# Patient Record
Sex: MALE | Race: WHITE | NOT HISPANIC OR LATINO | URBAN - METROPOLITAN AREA
[De-identification: names, ages, dates, MRNs, and addresses within clinical notes are randomized per-mention and may not be internally consistent; named-entity substitution may affect disease eponyms.]

---

## 2022-10-03 ENCOUNTER — ESTABLISHED PATIENT (OUTPATIENT)
Facility: LOCATION | Age: 67
End: 2022-10-03

## 2022-10-03 DIAGNOSIS — H52.13: ICD-10-CM

## 2022-10-03 PROCEDURE — 92014 COMPRE OPH EXAM EST PT 1/>: CPT

## 2022-10-03 PROCEDURE — 92015KEC REFRACTION KEC

## 2022-10-03 ASSESSMENT — VISUAL ACUITY
OD_CC: J1+
OS_CC: 20/40+1
OD_CC: 20/30+2
OS_CC: J1+

## 2022-10-03 ASSESSMENT — TONOMETRY
OS_IOP_MMHG: 16
OD_IOP_MMHG: 16

## 2023-11-22 ENCOUNTER — ESTABLISHED PATIENT (OUTPATIENT)
Facility: LOCATION | Age: 68
End: 2023-11-22

## 2023-11-22 DIAGNOSIS — H52.13: ICD-10-CM

## 2023-11-22 DIAGNOSIS — H43.812: ICD-10-CM

## 2023-11-22 DIAGNOSIS — H40.013: ICD-10-CM

## 2023-11-22 PROCEDURE — 92014 COMPRE OPH EXAM EST PT 1/>: CPT

## 2023-11-22 ASSESSMENT — VISUAL ACUITY
OD_CC: 20/20
OS_CC: 20/20

## 2023-11-22 ASSESSMENT — TONOMETRY
OS_IOP_MMHG: 14
OD_IOP_MMHG: 14

## 2024-01-08 ENCOUNTER — ESTABLISHED PATIENT (OUTPATIENT)
Facility: LOCATION | Age: 69
End: 2024-01-08

## 2024-01-08 DIAGNOSIS — H16.223: ICD-10-CM

## 2024-01-08 DIAGNOSIS — H25.9: ICD-10-CM

## 2024-01-08 DIAGNOSIS — H40.013: ICD-10-CM

## 2024-01-08 DIAGNOSIS — H52.13: ICD-10-CM

## 2024-01-08 DIAGNOSIS — H43.812: ICD-10-CM

## 2024-01-08 PROCEDURE — 92014 COMPRE OPH EXAM EST PT 1/>: CPT

## 2024-01-08 PROCEDURE — 92015 DETERMINE REFRACTIVE STATE: CPT

## 2024-01-08 ASSESSMENT — VISUAL ACUITY
OD_CC: 20/25-1
OS_CC: 20/25-2
OS_CC: J1
OD_CC: J2-1

## 2024-01-08 ASSESSMENT — TONOMETRY
OD_IOP_MMHG: 21
OS_IOP_MMHG: 21

## 2025-05-06 NOTE — PATIENT DISCUSSION
Nurse reached out to discuss the recent referral entered for  [facility] program.    RN provided the patient with an overview of the program including the Triage & referral entry.    Patient declined to participate in the program at this time. Patient will CB if he feels the need to take part in the triage process. Patient is aware a referral for an evaluation would be entered in to his chart. Patient understood and thanked the nurse for calling today.  Nurse confirmed the patient has the contact information for CS and encouraged to call the program back if needed. Patient agreed and was very appreciative of  the f/u call and information.    Nurse wished him well and  the referral was closed per protocol. Nurse reminded him that the information is located on his ED AVS.   spherecylinderaxisaddprismvertexVAInt VANVExaminer